# Patient Record
Sex: FEMALE | Race: WHITE
[De-identification: names, ages, dates, MRNs, and addresses within clinical notes are randomized per-mention and may not be internally consistent; named-entity substitution may affect disease eponyms.]

---

## 2021-05-13 ENCOUNTER — HOSPITAL ENCOUNTER (EMERGENCY)
Dept: HOSPITAL 61 - ER | Age: 21
Discharge: HOME | End: 2021-05-13
Payer: SELF-PAY

## 2021-05-13 VITALS — BODY MASS INDEX: 21.09 KG/M2 | HEIGHT: 63 IN | WEIGHT: 119.05 LBS

## 2021-05-13 DIAGNOSIS — G43.909: ICD-10-CM
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
## 2021-05-13 DIAGNOSIS — S16.1XXA: Primary | ICD-10-CM
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
## 2021-05-13 PROCEDURE — 81025 URINE PREGNANCY TEST: CPT

## 2021-05-13 PROCEDURE — 99284 EMERGENCY DEPT VISIT MOD MDM: CPT

## 2021-05-13 PROCEDURE — 96372 THER/PROPH/DIAG INJ SC/IM: CPT

## 2021-05-13 NOTE — ED.ADGEN
Past Medical History


Past Medical History:  Migraines


Past Surgical History:  No Surgical History


Smoking Status:  Never Smoker


Alcohol Use:  None





General Adult


EDM:


Chief Complaint:  NECK PAIN





HPI:


HPI:





Patient is a 21  year old female who presents emergency department with 

complaints of pain in the left side of her neck that radiates to her left 

shoulder for the last 3 days.  Patient states that she felt something pull in 

the left side of her neck 3 days ago.  She denies any numbness, tingling, or 

weakness of her extremities.  She states she is not able to turn her head to the

right due to the pain.  She states that earlier the pain was so severe she 

actually vomited.  She denies any abdominal pain, diarrhea, fever, headache, 

sore throat, cough, shortness of breath, chest pain, recurrent nausea. She 

currently rates the pain a 7 out of 10 on the pain scale.  She denies any 

alleviating factors, the pain is worse with movement and palpation.  Patient 

denies any concerns of pregnancy.





Review of Systems:


Review of Systems:


Complete ROS is negative unless otherwise noted in HPI.





Current Medications:





Current Medications








 Medications


  (Trade)  Dose


 Ordered  Sig/Marek  Start Time


 Stop Time Status Last Admin


Dose Admin


 


 Ketorolac


 Tromethamine


  (Toradol 30mg


 Vial)  30 mg  1X  ONCE  5/13/21 19:30


 5/13/21 19:31 DC 5/13/21 19:58


30 MG


 


 Orphenadrine


 Citrate


  (Norflex)  60 mg  1X  ONCE  5/13/21 19:30


 5/13/21 19:31 DC 5/13/21 19:57


60 MG











Allergies:


Allergies:





Allergies








Coded Allergies Type Severity Reaction Last Updated Verified


 


  No Known Drug Allergies    5/13/21 No











Physical Exam:


PE:


See Above


Constitutional: Well developed, well nourished, no acute distress, non-toxic 

appearance. []


HENT: Normocephalic, atraumatic, bilateral external ears normal, nose normal. []


Eyes: PERRLA, EOMI, conjunctiva normal, no discharge. [] 


Neck: Limited range of motion due to pain, no stridor, left paraspinal cervical 

tenderness to palpation, radiates to left shoulder


Cardiovascular:Heart rate regular rhythm


Lungs & Thorax:  Respirations even and unlabored, no retractions, no respiratory

 distress


Skin: Warm, dry, no erythema, no rash. [] 


Extremities: No cyanosis, ROM intact, no edema. [] 


Neurologic: Alert and oriented X 3, normal sensory, no focal deficits noted. []


Psychologic: Affect normal, judgement normal, mood normal. []





Current Patient Data:


Labs:





                                Laboratory Tests








Test


 5/13/21


19:32


 


POC Urine HCG, Qualitative


 Hcg negative


(Negative)








Vital Signs:





                                   Vital Signs








  Date Time  Temp Pulse Resp B/P (MAP) Pulse Ox O2 Delivery O2 Flow Rate FiO2


 


5/13/21 19:02 98.3       





 98.3       











EKG:


EKG:


[]





Heart Score:


C/O Chest Pain:  No


Risk Scores:


Score 0 - 3:  2.5% MACE over next 6 weeks - Discharge Home


Score 4 - 6:  20.3% MACE over next 6 weeks - Admit for Clinical Observation


Score 7 - 10:  72.7% MACE over next 6 weeks - Early Invasive Strategies





Radiology/Procedures:


Radiology/Procedures:


[]





Course & Med Decision Making:


Course & Med Decision Making


Pertinent Labs and Imaging studies reviewed. (See chart for details)


21-year-old female presents emergency department with complaints of neck pain 

for the last 3 days.  Patient was given orphenadrine and Toradol in the 

emergency department.  She reported feeling better after the pain medications 

were given.  Prescription was written for Flexeril and naproxen.  Patient 

encouraged to follow-up with her primary care doctor in 1 to 2 days, return to 

the ER if symptoms worsen.





Patient verbalized an understanding of home care, medications, follow-up, and 

return to ED instructions and was in agreement with the plan of care.


[]





Dragon Disclaimer:


Dragon Disclaimer:


This electronic medical record was generated, in whole or in part, using a voice

 recognition dictation system.





Departure


Departure


Impression:  


   Primary Impression:  


   Strain of cervical portion of left trapezius muscle


Disposition:  01 HOME / SELF CARE / HOMELESS


Condition:  STABLE


Referrals:  


NO PCP (PCP)


Patient Instructions:  Cervical Strain and Sprain with [facility]





Additional Instructions:  


Fill the prescription(s) and use as directed. Apply heat or ice for to sore 

areas as needed for comfort. Activity as tolerated. Follow up with your primary 

care doctor this week if symptoms persist, return to the ER if symptoms worsen 

or you develop a fever.


Scripts


Naproxen (NAPROXEN) 500 Mg Tablet


1 TAB PO BID PRN for PAIN for 10 Days, #20 TAB 0 Refills


   Prov: BOGUSLAW,HERI D APRN         5/13/21 


Cyclobenzaprine Hcl (CYCLOBENZAPRINE HCL) 10 Mg Tablet


1 TAB PO TID PRN for MUSCLE PAIN for 10 Days, #30 TAB 0 Refills


   Prov: HERI LEVIN         5/13/21











HERI LEVIN       May 13, 2021 20:38

## 2021-06-11 ENCOUNTER — HOSPITAL ENCOUNTER (EMERGENCY)
Dept: HOSPITAL 61 - ER | Age: 21
LOS: 1 days | Discharge: HOME | End: 2021-06-12
Payer: SELF-PAY

## 2021-06-11 VITALS — WEIGHT: 120.15 LBS | HEIGHT: 63 IN | BODY MASS INDEX: 21.29 KG/M2

## 2021-06-11 DIAGNOSIS — G43.909: ICD-10-CM

## 2021-06-11 DIAGNOSIS — N93.9: Primary | ICD-10-CM

## 2021-06-11 LAB
ALBUMIN SERPL-MCNC: 4.6 G/DL (ref 3.4–5)
ALBUMIN/GLOB SERPL: 1.3 {RATIO} (ref 1–1.7)
ALP SERPL-CCNC: 56 U/L (ref 46–116)
ALT SERPL-CCNC: 25 U/L (ref 14–59)
ANION GAP SERPL CALC-SCNC: 8 MMOL/L (ref 6–14)
APTT PPP: YELLOW S
AST SERPL-CCNC: 16 U/L (ref 15–37)
BACTERIA #/AREA URNS HPF: (no result) /HPF
BASOPHILS # BLD AUTO: 0.1 X10^3/UL (ref 0–0.2)
BASOPHILS NFR BLD: 1 % (ref 0–3)
BILIRUB SERPL-MCNC: 0.6 MG/DL (ref 0.2–1)
BILIRUB UR QL STRIP: NEGATIVE
BUN SERPL-MCNC: 16 MG/DL (ref 7–20)
BUN/CREAT SERPL: 20 (ref 6–20)
CALCIUM SERPL-MCNC: 9 MG/DL (ref 8.5–10.1)
CHLORIDE SERPL-SCNC: 105 MMOL/L (ref 98–107)
CO2 SERPL-SCNC: 27 MMOL/L (ref 21–32)
CREAT SERPL-MCNC: 0.8 MG/DL (ref 0.6–1)
EOSINOPHIL NFR BLD: 0.2 X10^3/UL (ref 0–0.7)
EOSINOPHIL NFR BLD: 3 % (ref 0–3)
ERYTHROCYTE [DISTWIDTH] IN BLOOD BY AUTOMATED COUNT: 12.8 % (ref 11.5–14.5)
FIBRINOGEN PPP-MCNC: CLEAR MG/DL
GFR SERPLBLD BASED ON 1.73 SQ M-ARVRAT: 89.7 ML/MIN
GLUCOSE SERPL-MCNC: 89 MG/DL (ref 70–99)
HCT VFR BLD CALC: 41.7 % (ref 36–47)
HGB BLD-MCNC: 14.2 G/DL (ref 12–15.5)
LIPASE: 133 U/L (ref 73–393)
LYMPHOCYTES # BLD: 2.8 X10^3/UL (ref 1–4.8)
LYMPHOCYTES NFR BLD AUTO: 35 % (ref 24–48)
MCH RBC QN AUTO: 29 PG (ref 25–35)
MCHC RBC AUTO-ENTMCNC: 34 G/DL (ref 31–37)
MCV RBC AUTO: 85 FL (ref 79–100)
MONO #: 0.5 X10^3/UL (ref 0–1.1)
MONOCYTES NFR BLD: 6 % (ref 0–9)
NEUT #: 4.5 X10^3/UL (ref 1.8–7.7)
NEUTROPHILS NFR BLD AUTO: 56 % (ref 31–73)
NITRITE UR QL STRIP: NEGATIVE
PH UR STRIP: 6 [PH]
PLATELET # BLD AUTO: 277 X10^3/UL (ref 140–400)
POTASSIUM SERPL-SCNC: 3.5 MMOL/L (ref 3.5–5.1)
PROT SERPL-MCNC: 8.1 G/DL (ref 6.4–8.2)
PROT UR STRIP-MCNC: NEGATIVE MG/DL
RBC # BLD AUTO: 4.9 X10^6/UL (ref 3.5–5.4)
RBC #/AREA URNS HPF: (no result) /HPF (ref 0–2)
SODIUM SERPL-SCNC: 140 MMOL/L (ref 136–145)
UROBILINOGEN UR-MCNC: 0.2 MG/DL
WBC # BLD AUTO: 8.1 X10^3/UL (ref 4–11)
WBC #/AREA URNS HPF: (no result) /HPF (ref 0–4)

## 2021-06-11 PROCEDURE — 76856 US EXAM PELVIC COMPLETE: CPT

## 2021-06-11 PROCEDURE — 81001 URINALYSIS AUTO W/SCOPE: CPT

## 2021-06-11 PROCEDURE — 85025 COMPLETE CBC W/AUTO DIFF WBC: CPT

## 2021-06-11 PROCEDURE — 96361 HYDRATE IV INFUSION ADD-ON: CPT

## 2021-06-11 PROCEDURE — 96374 THER/PROPH/DIAG INJ IV PUSH: CPT

## 2021-06-11 PROCEDURE — 99285 EMERGENCY DEPT VISIT HI MDM: CPT

## 2021-06-11 PROCEDURE — 76830 TRANSVAGINAL US NON-OB: CPT

## 2021-06-11 PROCEDURE — 81025 URINE PREGNANCY TEST: CPT

## 2021-06-11 PROCEDURE — 83690 ASSAY OF LIPASE: CPT

## 2021-06-11 PROCEDURE — 96375 TX/PRO/DX INJ NEW DRUG ADDON: CPT

## 2021-06-11 PROCEDURE — 36415 COLL VENOUS BLD VENIPUNCTURE: CPT

## 2021-06-11 PROCEDURE — 80053 COMPREHEN METABOLIC PANEL: CPT

## 2021-06-11 NOTE — PHYS DOC
Past Medical History


Past Medical History:  Migraines


 (RACHID VU APRN)


Past Surgical History:  No Surgical History


 (RACHID VU APRN)


Smoking Status:  Never Smoker


Alcohol Use:  None


 (RACHID VU APRN)





General Adult


EDM:


Chief Complaint:  ABDOMINAL PAIN





HPI:


HPI:





Patient is a 21  year old female who presents with states last couple days she 

has had vaginal bleeding.  She states yesterday it was just spotting.  She 

states today it was like a period.  She states that she is not supposed to be on

her menstrual cycle right now.  She denies any concern for sexual transmitted 

disease.  She denies any abnormal vaginal discharge besides having vaginal 

bleeding at this time.  She states when she ate today she had a sharp shooting 

pain across her mid abdomen.  She states that she is right the pain at a 5 out 

of 10.  She denies fever, diarrhea, nausea, vomiting, urinary symptoms, concern 

for STD, back pain, dizziness, headache.  She states her only history is 

migraines and she takes no medication daily.


 (RACHID VU APRN)





Review of Systems:


Review of Systems:


Constitutional:   Denies fever or chills. []


Eyes:   Denies change in visual acuity. []


HENT:   Denies nasal congestion or sore throat. [] 


Respiratory:   Denies cough or shortness of breath. [] 


Cardiovascular:   Denies chest pain or edema. [] 


GI:   + abdominal pain, denies nausea, vomiting, bloody stools or diarrhea. [] 


:  Denies dysuria. + Vaginal bleeding [] 


Musculoskeletal:   Denies back pain or joint pain. [] 


Integument:   Denies rash. [] 


Neurologic:   Denies headache, focal weakness or sensory changes. [] 


Endocrine:   Denies polyuria or polydipsia. [] 


Lymphatic:  Denies swollen glands. [] 


Psychiatric:  Denies depression or anxiety. []


 (RACHID VU APRN)





Heart Score:


C/O Chest Pain:  No


Risk Factors:


Risk Factors:  DM, Current or recent (<one month) smoker, HTN, HLP, family 

history of CAD, obesity.


Risk Scores:


Score 0 - 3:  2.5% MACE over next 6 weeks - Discharge Home


Score 4 - 6:  20.3% MACE over next 6 weeks - Admit for Clinical Observation


Score 7 - 10:  72.7% MACE over next 6 weeks - Early Invasive Strategies


 (RACHID VU)





Current Medications:





Current Medications








 Medications


  (Trade)  Dose


 Ordered  Sig/Marek  Start Time


 Stop Time Status Last Admin


Dose Admin


 


 Sodium Chloride  1,000 ml @ 


 1,000 mls/hr  Q1H  21 22:15


 21 23:14 UNV  











 (RACHID VU)





Allergies:


Allergies:





Allergies








Coded Allergies Type Severity Reaction Last Updated Verified


 


  No Known Drug Allergies    21 No








 (RACHID VU)





Physical Exam:


PE:





Constitutional: Well developed, well nourished, no acute distress, non-toxic 

appearance. []


HENT: Normocephalic, atraumatic, bilateral external ears normal, oropharynx 

moist, no oral exudates, nose normal. []


Eyes: PERRLA, EOMI, conjunctiva normal, no discharge. [] 


Neck: Normal range of motion, no tenderness, supple, no stridor. [] 


Cardiovascular:Heart rate regular rhythm, no murmur []


Lungs & Thorax:  Bilateral breath sounds clear to auscultation []


Abdomen: Bowel sounds normal, soft, no tenderness, no masses, no pulsatile 

masses. [] 


Skin: Warm, dry, no erythema, no rash. [] 


Back: No tenderness, no CVA tenderness. [] 


Extremities: No tenderness, no cyanosis, no clubbing, ROM intact, no edema. [] 


Neurologic: Alert and oriented X 3, normal motor function, normal sensory 

function, no focal deficits noted. []


Psychologic: Affect normal, judgement normal, mood normal. []





**Normal physical exam


 (RACHID VU)





Current Patient Data:


Labs:





                                Laboratory Tests








Test


 21


20:43


 


POC Urine HCG, Qualitative


 Hcg negative


(Negative)








 (RACHID VU)





EKG:


EKG:


[]


 (RACHID VU)





Radiology/Procedures:


Radiology/Procedures:


[]


 (RACHID VU)


Radiology/Procedures:


                                 IMAGING REPORT





                                     Signed





PATIENT: LEIGH MEJIAACCOUNT: [id number]     MRN#: [id number]


: 2000           LOCATION: ER              AGE: 21


SEX: F                    EXAM DT: 21         ACCESSION#: [id number]


STATUS: REG ER            ORD. PHYSICIAN: RACHID VU


REASON: VAGINAL BLEEDING


PROCEDURE: PELVIS W/TV





EXAM: ULTRASOUND PELVIS





INDICATION: Reason: VAGINAL BLEEDING / Spl. Instructions:  / History: .  





COMPARISON: None available.





TECHNIQUE: Transabdominal and transvaginal sonography was performed.





FINDINGS:


The uterus measures 7.1 x 4.4 x 3.0 cm.  The endometrium measures 0.5 cm. There 

is no focal myometrial abnormality





The right ovary is not seen.





The left ovary measures 2.1 x 2.7 x 2.1 cm.  Vascular flow identified within the

 left ovary





There is no free fluid.





IMPRESSION:


Right ovary not visualized. Normal sonographic appearance of the uterus and left

 ovary.





Electronically signed by: Lory Brar MD (2021 12:04 AM) [facility]














DICTATED and SIGNED BY:     LORY BRAR MD


DATE:     21 [id number] 0


 (IBIS THACKER DO)


Course & Med Decision Making:


Course & Med Decision Making


Pertinent Labs and Imaging studies reviewed. (See chart for details)





See HPI.  Alert and oriented x4.  Ambulatory with steady gait.  Skin pink warm 

and dry.  Abdomen is soft and nontender.  Speaks in full clear sentences.  No 

CVA tenderness.  Afebrile.





2249: Urinalysis shows no infection.  Blood work is unremarkable.  Ultrasound 

pending.  Patient signed over to Dr. Thacker.





[]


 (RACHID VU)


Course & Med Decision Making


Concern for menorrhagia.  Labs unremarkable, urinalysis with blood consistent 

with active menses.  Patient states she is only used 2 tampons per day.  No 

heavy brisk flow.  Patient hemodynamically stable and reports pain has improved 

with analgesia in ED. will discharge home with strict ED return precautions were

 given for brisk bleeding, syncope, chest pain, exertional dyspnea pain. 

Encouraged urgent outpatient follow-up with PMD and OB/GYN for definitive 

management.  Life-threatening processes were considered but are low suspicion at

 this time, given history, physical exam and ED workup. Pt was educated on all 

prescription medications and adverse effects.  All patient's questions were 

answered and pt was stable at time of discharge.





Life/limb-threatening differential includes but is not limited to, aortic 

dissection, aortic aneurysm, acute coronary syndrome, surgical abdomen 

(appendicitis, cholecystitis, ischemic bowel, strangulated hernia, etc), bowel 

obstruction or volvulus, bladder outlet obstruction, gastrointestinal bleeding, 

inflammatory bowel disease, peptic ulcer disease, ACS/CAD, sepsis, diverticular 

disease, ureterolithiasis,  nephrolithiasis, ovarian or testicular torsion, 

ectopic pregnancy, vaginal hemorrhage, or genitourinary infection.





I spoken with the patient and her caregivers.  I explained the patient's 

condition, diagnoses and treatment plan based on the information available to me

 at this time.  I have answered the patient and her caregiver's questions and 

addressed any concerns.  The patient and her caregivers have a good understan

ding of patient's diagnosis, condition and treatment plan as can be expected at 

this point.  Vital signs have been stable.  Patient's condition is stable and 

appropriate for discharge from the emergency department. 





Patient will pursue further outpatient evaluation with primary care physician or

 other designated or consulting physician as outlined in the discharge 

instructions.  The patient and/or caregivers are agreeable to this plan of care 

and follow-up instructions have been explained in detail.  The patient and/or 

caregivers have received these instructions in written form and have expressed 

an understanding of the discharge instructions.  The patient and/or caregivers 

are aware that any significant change of condition or worsening of symptoms 

should prompt immediate return to this or the closest emergency department or 

call to 911.


 (IBIS RUSSO DO)


Dragon Disclaimer:


Dragon Disclaimer:


This electronic medical record was generated, in whole or in part, using a voice

 recognition dictation system.


 (RACHID VU APRN)





Departure


Departure


Impression:  


   Primary Impression:  


   Abnormal uterine bleeding (AUB)


Disposition:   HOME / SELF CARE / HOMELESS


Condition:  STABLE


Referrals:  


NO PCP (PCP)


Follow-up for routine care with


FOLLOW UP WITH FAMILY MEDICINE:


[address], [address]


[place] [address]


Phone: [phone]


Patient Instructions:  Abnormal Uterine Bleeding





Additional Instructions:  


FOLLOW UP WITH  OB/GYN: FOR DEFINITIVE MANAGEMENT OF ABNORMAL MENSES


[facility]


[address], [address]


[place] [address]


Phone: [phone]





EMERGENCY DEPARTMENT GENERAL DISCHARGE INSTRUCTIONS





Thank you for coming to [facility] Emergency Department (ED) 

today and 


trusting us with you care.  We trust that you had a positive experience in our 

Emergency 


Department.  If you wish to speak to the department management, you may call the

 Director at 


[phone].





YOUR FOLLOW UP INSTRUCTIONS ARE AS FOLLOWS:





1.  Do you have a private Doctor?  If you do not have a private doctor, please 

ask for a 


resource list of physicians or clinics that may be able to assist you with 

follow up care.





2.  The Emergency Physicain has interpreted your x-rays.  The X-Ray specialist 

will also 


review them.  If there is a change in the findings, you will be notified in 48 

hours when at 


all possible.





3.  A lab test or culture has been done, your results will be reviewed and you 

will be 


notified if you need a change in treatment.





ADDITIONAL INSTRUCTIONS AND INFORMATION:





1.  Your care today has been supervised by a physician who is specially trained 

in emergency 


care.  Many problems require more than one evaluation for a complete diagnosis 

and 


treatment.  We recommend that you schedule your follow up appointment as 

recommended to 


ensure complete treatment of you illness or injury.  If you are unable to obtain

 follow up 


care and continue to have a problem, or if your condition worsens, we recommend 

that you 


return to the ED.





2.  We are not able to safely determine your condition over the phone nor are we

 able to 


give sound medical advice over the phone.  For these safety reasons, if you call

 for medical 


advice we will ask you to come to the ED for further evaluation.





3.  If you have any questions regarding these discharge instructions please call

 the ED at 


[phone].





SAFETY INFORMATION:





In the interest of safety, wellness, and injury prevention; we encourage you to 

wear your 


sealbelt, if you smoke; quite smoking, and we encourage family to use a 

protective helmet 


for bicycling and other sporting events that present an increased risk for head 

injury.





IF YOUR SYMPTOMS WORSEN OR NEW SYMPTOMS DEVELOP, OR YOU HAVE CONCERNS ABOUT YOUR

 CONDITION; 


OR IF YOUR CONDITION WORSENS WHILE YOU ARE WAITING FOR YOUR FOLLOW UP 

APPOINTMENT; EITHER 


CONTACT YOUR PRIMARY CARE DOCTOR, THE PHYSICIAN WHOSE NAME AND NUMBER YOU WERE 

GIVEN, OR 


RETURN TO THE ED IMMEDIATELY.











RACHID VU            2021 22:26


IBIS THACKER DO               2021 03:09

## 2021-06-12 VITALS — SYSTOLIC BLOOD PRESSURE: 113 MMHG | DIASTOLIC BLOOD PRESSURE: 74 MMHG

## 2021-06-12 NOTE — RAD
EXAM: ULTRASOUND PELVIS



INDICATION: Reason: VAGINAL BLEEDING / Spl. Instructions:  / History: .  



COMPARISON: None available.



TECHNIQUE: Transabdominal and transvaginal sonography was performed.



FINDINGS:

The uterus measures 7.1 x 4.4 x 3.0 cm.  The endometrium measures 0.5 cm. There is no focal myometria
l abnormality



The right ovary is not seen.



The left ovary measures 2.1 x 2.7 x 2.1 cm.  Vascular flow identified within the left ovary



There is no free fluid.



IMPRESSION:

Right ovary not visualized. Normal sonographic appearance of the uterus and left ovary.



Electronically signed by: Lory Wilkes MD (6/12/2021 12:04 AM) [facility]